# Patient Record
Sex: MALE | Race: WHITE | NOT HISPANIC OR LATINO
[De-identification: names, ages, dates, MRNs, and addresses within clinical notes are randomized per-mention and may not be internally consistent; named-entity substitution may affect disease eponyms.]

---

## 2018-04-03 ENCOUNTER — APPOINTMENT (OUTPATIENT)
Dept: INTERNAL MEDICINE | Facility: CLINIC | Age: 23
End: 2018-04-03
Payer: COMMERCIAL

## 2018-04-03 VITALS
DIASTOLIC BLOOD PRESSURE: 70 MMHG | RESPIRATION RATE: 14 BRPM | TEMPERATURE: 98.3 F | BODY MASS INDEX: 19.08 KG/M2 | OXYGEN SATURATION: 98 % | WEIGHT: 123 LBS | SYSTOLIC BLOOD PRESSURE: 120 MMHG | HEART RATE: 74 BPM | HEIGHT: 67.5 IN

## 2018-04-03 DIAGNOSIS — S62.609A FRACTURE OF UNSPECIFIED PHALANX OF UNSPECIFIED FINGER, INITIAL ENCOUNTER FOR CLOSED FRACTURE: ICD-10-CM

## 2018-04-03 PROCEDURE — 99214 OFFICE O/P EST MOD 30 MIN: CPT

## 2019-07-11 ENCOUNTER — APPOINTMENT (OUTPATIENT)
Dept: INTERNAL MEDICINE | Facility: CLINIC | Age: 24
End: 2019-07-11
Payer: COMMERCIAL

## 2019-07-11 VITALS
RESPIRATION RATE: 14 BRPM | DIASTOLIC BLOOD PRESSURE: 68 MMHG | HEART RATE: 102 BPM | SYSTOLIC BLOOD PRESSURE: 100 MMHG | TEMPERATURE: 98.4 F | OXYGEN SATURATION: 97 %

## 2019-07-11 PROCEDURE — 99214 OFFICE O/P EST MOD 30 MIN: CPT

## 2019-07-11 RX ORDER — FLUTICASONE PROPIONATE 50 UG/1
50 SPRAY, METERED NASAL DAILY
Qty: 1 | Refills: 2 | Status: ACTIVE | COMMUNITY
Start: 2019-07-11 | End: 1900-01-01

## 2019-07-11 NOTE — HISTORY OF PRESENT ILLNESS
[FreeTextEntry8] : Pt developed a sore throat 10 days ago. Pt has developed cough, sore throat, sneezing over the last 10 days.\par No fever.

## 2019-09-06 ENCOUNTER — APPOINTMENT (OUTPATIENT)
Dept: INTERNAL MEDICINE | Facility: CLINIC | Age: 24
End: 2019-09-06
Payer: COMMERCIAL

## 2019-09-06 VITALS
HEIGHT: 67.5 IN | OXYGEN SATURATION: 98 % | DIASTOLIC BLOOD PRESSURE: 78 MMHG | RESPIRATION RATE: 14 BRPM | TEMPERATURE: 98.3 F | SYSTOLIC BLOOD PRESSURE: 122 MMHG | HEART RATE: 92 BPM | BODY MASS INDEX: 20.79 KG/M2 | WEIGHT: 134 LBS

## 2019-09-06 PROCEDURE — 36415 COLL VENOUS BLD VENIPUNCTURE: CPT

## 2019-09-06 PROCEDURE — 99395 PREV VISIT EST AGE 18-39: CPT | Mod: 25

## 2019-09-06 NOTE — PHYSICAL EXAM
[No Acute Distress] : no acute distress [Well Nourished] : well nourished [Well Developed] : well developed [Well-Appearing] : well-appearing [Normal Voice/Communication] : normal voice/communication [Normal Sclera/Conjunctiva] : normal sclera/conjunctiva [EOMI] : extraocular movements intact [PERRL] : pupils equal round and reactive to light [Normal Outer Ear/Nose] : the outer ears and nose were normal in appearance [Normal Oropharynx] : the oropharynx was normal [Normal TMs] : both tympanic membranes were normal [No JVD] : no jugular venous distention [Supple] : supple [No Lymphadenopathy] : no lymphadenopathy [Thyroid Normal, No Nodules] : the thyroid was normal and there were no nodules present [No Respiratory Distress] : no respiratory distress  [No Accessory Muscle Use] : no accessory muscle use [Clear to Auscultation] : lungs were clear to auscultation bilaterally [Normal Rate] : normal rate  [Normal S1, S2] : normal S1 and S2 [Regular Rhythm] : with a regular rhythm [No Murmur] : no murmur heard [No Carotid Bruits] : no carotid bruits [No Abdominal Bruit] : a ~M bruit was not heard ~T in the abdomen [No Varicosities] : no varicosities [Pedal Pulses Present] : the pedal pulses are present [No Edema] : there was no peripheral edema [No Palpable Aorta] : no palpable aorta [No Extremity Clubbing/Cyanosis] : no extremity clubbing/cyanosis [Soft] : abdomen soft [Non-distended] : non-distended [Non Tender] : non-tender [No Masses] : no abdominal mass palpated [No HSM] : no HSM [Normal Supraclavicular Nodes] : no supraclavicular lymphadenopathy [Normal Bowel Sounds] : normal bowel sounds [Normal Posterior Cervical Nodes] : no posterior cervical lymphadenopathy [Normal Anterior Cervical Nodes] : no anterior cervical lymphadenopathy [No CVA Tenderness] : no CVA  tenderness [No Spinal Tenderness] : no spinal tenderness [No Joint Swelling] : no joint swelling [Grossly Normal Strength/Tone] : grossly normal strength/tone [No Rash] : no rash [Coordination Grossly Intact] : coordination grossly intact [No Focal Deficits] : no focal deficits [Normal Gait] : normal gait [Deep Tendon Reflexes (DTR)] : deep tendon reflexes were 2+ and symmetric [Speech Grossly Normal] : speech grossly normal [Memory Grossly Normal] : memory grossly normal [Normal Affect] : the affect was normal [Normal Mood] : the mood was normal [Normal Insight/Judgement] : insight and judgment were intact

## 2019-09-11 LAB
25(OH)D3 SERPL-MCNC: 17.5 NG/ML
ALBUMIN SERPL ELPH-MCNC: 4.8 G/DL
ALP BLD-CCNC: 76 U/L
ALT SERPL-CCNC: 18 U/L
ANION GAP SERPL CALC-SCNC: 14 MMOL/L
AST SERPL-CCNC: 14 U/L
BASOPHILS # BLD AUTO: 0.04 K/UL
BASOPHILS NFR BLD AUTO: 0.6 %
BILIRUB SERPL-MCNC: 0.4 MG/DL
BUN SERPL-MCNC: 13 MG/DL
CALCIUM SERPL-MCNC: 9.7 MG/DL
CHLORIDE SERPL-SCNC: 101 MMOL/L
CHOLEST SERPL-MCNC: 144 MG/DL
CHOLEST/HDLC SERPL: 2.6 RATIO
CK SERPL-CCNC: 121 U/L
CO2 SERPL-SCNC: 26 MMOL/L
CREAT SERPL-MCNC: 0.81 MG/DL
CREAT SPEC-SCNC: 128 MG/DL
EOSINOPHIL # BLD AUTO: 0.04 K/UL
EOSINOPHIL NFR BLD AUTO: 0.6 %
ESTIMATED AVERAGE GLUCOSE: 203 MG/DL
GLUCOSE SERPL-MCNC: 231 MG/DL
HBA1C MFR BLD HPLC: 8.7 %
HCT VFR BLD CALC: 46.2 %
HDLC SERPL-MCNC: 56 MG/DL
HGB BLD-MCNC: 15.4 G/DL
IMM GRANULOCYTES NFR BLD AUTO: 0.3 %
LDLC SERPL CALC-MCNC: 67 MG/DL
LYMPHOCYTES # BLD AUTO: 1.68 K/UL
LYMPHOCYTES NFR BLD AUTO: 24.5 %
MAN DIFF?: NORMAL
MCHC RBC-ENTMCNC: 29.2 PG
MCHC RBC-ENTMCNC: 33.3 GM/DL
MCV RBC AUTO: 87.5 FL
MICROALBUMIN 24H UR DL<=1MG/L-MCNC: 1.3 MG/DL
MICROALBUMIN/CREAT 24H UR-RTO: 10 MG/G
MONOCYTES # BLD AUTO: 0.52 K/UL
MONOCYTES NFR BLD AUTO: 7.6 %
NEUTROPHILS # BLD AUTO: 4.56 K/UL
NEUTROPHILS NFR BLD AUTO: 66.4 %
PLATELET # BLD AUTO: 221 K/UL
POTASSIUM SERPL-SCNC: 4.7 MMOL/L
PROT SERPL-MCNC: 6.6 G/DL
RBC # BLD: 5.28 M/UL
RBC # FLD: 12.7 %
SODIUM SERPL-SCNC: 141 MMOL/L
TRIGL SERPL-MCNC: 107 MG/DL
TSH SERPL-ACNC: 1.12 UIU/ML
WBC # FLD AUTO: 6.86 K/UL

## 2020-01-13 ENCOUNTER — APPOINTMENT (OUTPATIENT)
Dept: INTERNAL MEDICINE | Facility: CLINIC | Age: 25
End: 2020-01-13
Payer: COMMERCIAL

## 2020-01-13 VITALS
HEIGHT: 67.5 IN | SYSTOLIC BLOOD PRESSURE: 120 MMHG | BODY MASS INDEX: 20.94 KG/M2 | TEMPERATURE: 98.8 F | WEIGHT: 135 LBS | DIASTOLIC BLOOD PRESSURE: 70 MMHG | HEART RATE: 112 BPM | RESPIRATION RATE: 14 BRPM | OXYGEN SATURATION: 96 %

## 2020-01-13 DIAGNOSIS — J01.00 ACUTE MAXILLARY SINUSITIS, UNSPECIFIED: ICD-10-CM

## 2020-01-13 PROCEDURE — 99214 OFFICE O/P EST MOD 30 MIN: CPT

## 2020-01-13 PROCEDURE — 87880 STREP A ASSAY W/OPTIC: CPT | Mod: QW

## 2020-01-13 NOTE — REVIEW OF SYSTEMS
[Earache] : earache [Sore Throat] : sore throat [Cough] : cough [Negative] : Heme/Lymph [Nasal Discharge] : no nasal discharge [Shortness Of Breath] : no shortness of breath [Wheezing] : no wheezing

## 2020-01-13 NOTE — HISTORY OF PRESENT ILLNESS
[___ Days ago] : [unfilled] days ago [Episodic] : episodic  [Moderate] : moderate [Congestion] : congestion [Cough] : cough [Sore Throat] : sore throat [Earache] : earache [Worsening] : worsening [Wheezing] : no wheezing [Chills] : no chills [Anorexia] : no anorexia [Fatigue] : not fatigue [Shortness Of Breath] : no shortness of breath [Headache] : no headache [Fever] : no fever

## 2020-01-13 NOTE — PHYSICAL EXAM
[No Acute Distress] : no acute distress [Well Nourished] : well nourished [Well Developed] : well developed [Well-Appearing] : well-appearing [Normal Sclera/Conjunctiva] : normal sclera/conjunctiva [Normal Voice/Communication] : normal voice/communication [Normal Outer Ear/Nose] : the outer ears and nose were normal in appearance [EOMI] : extraocular movements intact [PERRL] : pupils equal round and reactive to light [Normal TMs] : both tympanic membranes were normal [Normal Oropharynx] : the oropharynx was normal [No JVD] : no jugular venous distention [Thyroid Normal, No Nodules] : the thyroid was normal and there were no nodules present [No Respiratory Distress] : no respiratory distress  [Supple] : supple [No Lymphadenopathy] : no lymphadenopathy [No Accessory Muscle Use] : no accessory muscle use [Clear to Auscultation] : lungs were clear to auscultation bilaterally [Normal Rate] : normal rate  [Regular Rhythm] : with a regular rhythm [Normal S1, S2] : normal S1 and S2 [No Murmur] : no murmur heard [No Abdominal Bruit] : a ~M bruit was not heard ~T in the abdomen [No Carotid Bruits] : no carotid bruits [No Varicosities] : no varicosities [Pedal Pulses Present] : the pedal pulses are present [No Edema] : there was no peripheral edema [No Extremity Clubbing/Cyanosis] : no extremity clubbing/cyanosis [No Palpable Aorta] : no palpable aorta [Soft] : abdomen soft [No Masses] : no abdominal mass palpated [Non-distended] : non-distended [Non Tender] : non-tender [Normal Supraclavicular Nodes] : no supraclavicular lymphadenopathy [Normal Bowel Sounds] : normal bowel sounds [No HSM] : no HSM [Normal Anterior Cervical Nodes] : no anterior cervical lymphadenopathy [Normal Posterior Cervical Nodes] : no posterior cervical lymphadenopathy [No CVA Tenderness] : no CVA  tenderness [No Spinal Tenderness] : no spinal tenderness [Grossly Normal Strength/Tone] : grossly normal strength/tone [No Joint Swelling] : no joint swelling [No Rash] : no rash [No Focal Deficits] : no focal deficits [Coordination Grossly Intact] : coordination grossly intact [Deep Tendon Reflexes (DTR)] : deep tendon reflexes were 2+ and symmetric [Normal Gait] : normal gait [Speech Grossly Normal] : speech grossly normal [Normal Insight/Judgement] : insight and judgment were intact [Normal Affect] : the affect was normal

## 2020-11-16 ENCOUNTER — APPOINTMENT (OUTPATIENT)
Dept: INTERNAL MEDICINE | Facility: CLINIC | Age: 25
End: 2020-11-16
Payer: COMMERCIAL

## 2020-11-16 PROCEDURE — G0008: CPT

## 2020-11-16 PROCEDURE — 99072 ADDL SUPL MATRL&STAF TM PHE: CPT

## 2020-11-16 PROCEDURE — 90686 IIV4 VACC NO PRSV 0.5 ML IM: CPT

## 2021-01-19 ENCOUNTER — APPOINTMENT (OUTPATIENT)
Dept: INTERNAL MEDICINE | Facility: CLINIC | Age: 26
End: 2021-01-19
Payer: COMMERCIAL

## 2021-01-19 ENCOUNTER — RX RENEWAL (OUTPATIENT)
Age: 26
End: 2021-01-19

## 2021-01-19 VITALS
TEMPERATURE: 97.5 F | HEIGHT: 67.5 IN | RESPIRATION RATE: 14 BRPM | HEART RATE: 93 BPM | SYSTOLIC BLOOD PRESSURE: 120 MMHG | WEIGHT: 141 LBS | OXYGEN SATURATION: 98 % | BODY MASS INDEX: 21.87 KG/M2 | DIASTOLIC BLOOD PRESSURE: 76 MMHG

## 2021-01-19 DIAGNOSIS — Z00.00 ENCOUNTER FOR GENERAL ADULT MEDICAL EXAMINATION W/OUT ABNORMAL FINDINGS: ICD-10-CM

## 2021-01-19 PROCEDURE — 99072 ADDL SUPL MATRL&STAF TM PHE: CPT

## 2021-01-19 PROCEDURE — 99395 PREV VISIT EST AGE 18-39: CPT

## 2021-01-19 RX ORDER — AZITHROMYCIN 250 MG/1
250 TABLET, FILM COATED ORAL
Qty: 1 | Refills: 0 | Status: DISCONTINUED | COMMUNITY
Start: 2019-07-11 | End: 2021-01-19

## 2021-01-19 RX ORDER — DOXYCYCLINE 100 MG/1
100 TABLET, FILM COATED ORAL
Qty: 14 | Refills: 0 | Status: DISCONTINUED | COMMUNITY
Start: 2020-01-13 | End: 2021-01-19

## 2021-01-19 NOTE — HEALTH RISK ASSESSMENT
[Good] : ~his/her~  mood as  good [No] : In the past 12 months have you used drugs other than those required for medical reasons? No [No falls in past year] : Patient reported no falls in the past year [0] : 2) Feeling down, depressed, or hopeless: Not at all (0) [] : No [[id number]] : 0

## 2021-01-19 NOTE — HISTORY OF PRESENT ILLNESS
[FreeTextEntry1] : annual physical  [de-identified] : SKYE CHERY is a 25 year old M who presents today for annual physical. Patient has no new complaints

## 2021-01-19 NOTE — PHYSICAL EXAM
[No Acute Distress] : no acute distress [Well Nourished] : well nourished [Well Developed] : well developed [Well-Appearing] : well-appearing [Normal Voice/Communication] : normal voice/communication [Normal Sclera/Conjunctiva] : normal sclera/conjunctiva [PERRL] : pupils equal round and reactive to light [EOMI] : extraocular movements intact [Normal Outer Ear/Nose] : the outer ears and nose were normal in appearance [Normal TMs] : both tympanic membranes were normal [No JVD] : no jugular venous distention [No Lymphadenopathy] : no lymphadenopathy [Thyroid Normal, No Nodules] : the thyroid was normal and there were no nodules present [Supple] : supple [No Respiratory Distress] : no respiratory distress  [No Accessory Muscle Use] : no accessory muscle use [Clear to Auscultation] : lungs were clear to auscultation bilaterally [Normal Rate] : normal rate  [Regular Rhythm] : with a regular rhythm [Normal S1, S2] : normal S1 and S2 [No Murmur] : no murmur heard [No Carotid Bruits] : no carotid bruits [No Abdominal Bruit] : a ~M bruit was not heard ~T in the abdomen [No Varicosities] : no varicosities [Pedal Pulses Present] : the pedal pulses are present [No Edema] : there was no peripheral edema [No Palpable Aorta] : no palpable aorta [No Extremity Clubbing/Cyanosis] : no extremity clubbing/cyanosis [Soft] : abdomen soft [Non Tender] : non-tender [Non-distended] : non-distended [No Masses] : no abdominal mass palpated [No HSM] : no HSM [Normal Bowel Sounds] : normal bowel sounds [No Hernias] : no hernias [Normal Supraclavicular Nodes] : no supraclavicular lymphadenopathy [Normal Axillary Nodes] : no axillary lymphadenopathy [Normal Posterior Cervical Nodes] : no posterior cervical lymphadenopathy [Normal Anterior Cervical Nodes] : no anterior cervical lymphadenopathy [Normal Inguinal Nodes] : no inguinal lymphadenopathy [Normal Femoral Nodes] : no femoral lymphadenopathy [No CVA Tenderness] : no CVA  tenderness [No Spinal Tenderness] : no spinal tenderness [No Joint Swelling] : no joint swelling [Grossly Normal Strength/Tone] : grossly normal strength/tone [No Rash] : no rash [Coordination Grossly Intact] : coordination grossly intact [No Focal Deficits] : no focal deficits [Normal Gait] : normal gait [Deep Tendon Reflexes (DTR)] : deep tendon reflexes were 2+ and symmetric [Speech Grossly Normal] : speech grossly normal [Memory Grossly Normal] : memory grossly normal [Normal Affect] : the affect was normal [Alert and Oriented x3] : oriented to person, place, and time [Normal Mood] : the mood was normal [Normal Insight/Judgement] : insight and judgment were intact [Penis Abnormality] : normal circumcised penis [Scrotum] : the scrotum was normal [Testes Tenderness] : no tenderness of the testes [Testes Mass (___cm)] : there were no testicular masses

## 2021-01-21 LAB
25(OH)D3 SERPL-MCNC: 28.7 NG/ML
ALBUMIN SERPL ELPH-MCNC: 5 G/DL
ALP BLD-CCNC: 82 U/L
ALT SERPL-CCNC: 19 U/L
ANION GAP SERPL CALC-SCNC: 14 MMOL/L
APPEARANCE: CLEAR
AST SERPL-CCNC: 16 U/L
BASOPHILS # BLD AUTO: 0.03 K/UL
BASOPHILS NFR BLD AUTO: 0.4 %
BILIRUB SERPL-MCNC: 0.3 MG/DL
BILIRUBIN URINE: NEGATIVE
BLOOD URINE: NEGATIVE
BUN SERPL-MCNC: 16 MG/DL
CALCIUM SERPL-MCNC: 9.9 MG/DL
CHLORIDE SERPL-SCNC: 99 MMOL/L
CHOLEST SERPL-MCNC: 167 MG/DL
CK SERPL-CCNC: 86 U/L
CO2 SERPL-SCNC: 26 MMOL/L
COLOR: NORMAL
CREAT SERPL-MCNC: 0.75 MG/DL
CREAT SPEC-SCNC: 88 MG/DL
EOSINOPHIL # BLD AUTO: 0.08 K/UL
EOSINOPHIL NFR BLD AUTO: 1.1 %
ESTIMATED AVERAGE GLUCOSE: 197 MG/DL
GLUCOSE QUALITATIVE U: ABNORMAL
GLUCOSE SERPL-MCNC: 244 MG/DL
HBA1C MFR BLD HPLC: 8.5 %
HCT VFR BLD CALC: 44.7 %
HDLC SERPL-MCNC: 59 MG/DL
HGB BLD-MCNC: 14.9 G/DL
IMM GRANULOCYTES NFR BLD AUTO: 0.3 %
KETONES URINE: NEGATIVE
LDLC SERPL CALC-MCNC: 96 MG/DL
LEUKOCYTE ESTERASE URINE: NEGATIVE
LYMPHOCYTES # BLD AUTO: 2.2 K/UL
LYMPHOCYTES NFR BLD AUTO: 31.2 %
MAN DIFF?: NORMAL
MCHC RBC-ENTMCNC: 28.8 PG
MCHC RBC-ENTMCNC: 33.3 GM/DL
MCV RBC AUTO: 86.3 FL
MICROALBUMIN 24H UR DL<=1MG/L-MCNC: 1.2 MG/DL
MICROALBUMIN/CREAT 24H UR-RTO: 14 MG/G
MONOCYTES # BLD AUTO: 0.48 K/UL
MONOCYTES NFR BLD AUTO: 6.8 %
NEUTROPHILS # BLD AUTO: 4.24 K/UL
NEUTROPHILS NFR BLD AUTO: 60.2 %
NITRITE URINE: NEGATIVE
NONHDLC SERPL-MCNC: 108 MG/DL
PH URINE: 6
PLATELET # BLD AUTO: 223 K/UL
POTASSIUM SERPL-SCNC: 4 MMOL/L
PROT SERPL-MCNC: 7.2 G/DL
PROTEIN URINE: NEGATIVE
RBC # BLD: 5.18 M/UL
RBC # FLD: 12.8 %
SODIUM SERPL-SCNC: 139 MMOL/L
SPECIFIC GRAVITY URINE: 1.04
TRIGL SERPL-MCNC: 60 MG/DL
TSH SERPL-ACNC: 1.33 UIU/ML
UROBILINOGEN URINE: NORMAL
WBC # FLD AUTO: 7.05 K/UL

## 2021-03-03 ENCOUNTER — APPOINTMENT (OUTPATIENT)
Dept: ENDOCRINOLOGY | Facility: CLINIC | Age: 26
End: 2021-03-03
Payer: COMMERCIAL

## 2021-03-03 ENCOUNTER — RESULT CHARGE (OUTPATIENT)
Age: 26
End: 2021-03-03

## 2021-03-03 VITALS
RESPIRATION RATE: 14 BRPM | HEIGHT: 67 IN | BODY MASS INDEX: 22.13 KG/M2 | HEART RATE: 88 BPM | OXYGEN SATURATION: 97 % | WEIGHT: 141 LBS | SYSTOLIC BLOOD PRESSURE: 136 MMHG | DIASTOLIC BLOOD PRESSURE: 81 MMHG

## 2021-03-03 PROCEDURE — 95251 CONT GLUC MNTR ANALYSIS I&R: CPT

## 2021-03-03 PROCEDURE — 36415 COLL VENOUS BLD VENIPUNCTURE: CPT

## 2021-03-03 PROCEDURE — 82962 GLUCOSE BLOOD TEST: CPT

## 2021-03-03 PROCEDURE — 99205 OFFICE O/P NEW HI 60 MIN: CPT | Mod: 25

## 2021-03-03 PROCEDURE — 99072 ADDL SUPL MATRL&STAF TM PHE: CPT

## 2021-03-04 NOTE — HISTORY OF PRESENT ILLNESS
[FreeTextEntry1] : Mr. SKYE CHERY  is a 25 year old male with past medical history of Diabetes Mellitus Type 1  who presents for management of his diabetes. Patient was diagnosed as a child. He has been on Omnipod for over 10 years. His pump settings have not been adjusted for a long time. Patients sugars have been trending higher. Patient denies any history of diabetic retinopathy, nephropathy or neuropathy. He denies any blurry vision, polyuria, polydipsia and numbness/tingling in the extremities.\par \par \par POCT Glucose: 159 mg/dL\par POCT Hga1c: %\par \par Diabetes first diagnosed: 2004\par Type: 1\par \par Current diabetic regimen:\par Omnipod with Novolog \par Pump settings: \par Basal:\par 12 AM: 1.05\par 5 AM: 1.1\par 10 AM: 1.15\par 5 PM: 1.2 pm\par 9 PM: 1.05\par \par ISF:\par 12 AM: 50\par \par I:C\par 12 AM: 15\par 5 AM: 10\par 10 AM: 15\par \par Target:\par 12 AM: 120\par \par \par Self monitoring blood glucose : Patient uses Freestyle Brittany\par \par AGP Report: \par (11/5/20-2/2/21)\par High: 33%\par Target (): 46%\par Low: 5%\par \par \par Diet: Eats everything. Will eat simple carbs and extend bolus\par \par Exercise:\par \par Urine micro:wnl (1/2021)\par lipid profile:LDL 96 (1/2021)\par last hba1c: 8.5% (1/2021)\par eye exam: 12/2020\par diabetic foot exam/podiatry:NA \par \par \par \par \par \par

## 2021-03-04 NOTE — ASSESSMENT
[FreeTextEntry1] :  25 year old male with past medical history of Diabetes Mellitus Type 1,  who presents for management of his diabetes\par \par 1. DM 1- uncontrolled, uncomplicated\par Patient counseled on the importance of diabetic control and complications. Patient's diet and the necessary changes discussed. Reviewed over freestyle tobin and use. Reviewed over glycemic goals. Patient has too much basal on board anf is not getting enough postprandially. Will need to reduce his highs and lows. Patient also needs to updated with newer devices and may benefit better with new automatic basal pumps. He is a lean type 1 and has difficulty gaining weight.\par New settings\par Basal:\par 12 AM: .95*\par 5 AM: 1.0*\par 10 AM: 1.15\par 5 PM: 1.2 pm\par 9 PM: 1.05\par \par ISF:\par 12 AM: 50\par \par I:C\par 12 AM: 15\par 5 AM: 10\par 10 AM: 12*\par \par Target:\par 12 AM: 120\par \par Check sensor\par Cont diabetic diet\par Cont exercise\par Will refer to CDE/RD\par labs UTD\par Opthalmology Visit up to date\par Foot Exam up to date\par \par \par \par

## 2021-03-04 NOTE — CONSULT LETTER
[Dear  ___] : Dear  [unfilled], [Consult Letter:] : I had the pleasure of evaluating your patient, [unfilled]. [Please see my note below.] : Please see my note below. [Consult Closing:] : Thank you very much for allowing me to participate in the care of this patient.  If you have any questions, please do not hesitate to contact me. [Sincerely,] : Sincerely, [FreeTextEntry3] : Tiffanie Jha MS. DO.\par Endocrinology, Diabetes and Metabolism\par [address] [place] [address]\[place] [address]\par Tel [phone]\par Fax [phone]\par

## 2021-03-05 LAB
ALBUMIN SERPL ELPH-MCNC: 4.9 G/DL
ALP BLD-CCNC: 75 U/L
ALT SERPL-CCNC: 16 U/L
ANION GAP SERPL CALC-SCNC: 11 MMOL/L
AST SERPL-CCNC: 16 U/L
BILIRUB SERPL-MCNC: 0.3 MG/DL
BUN SERPL-MCNC: 13 MG/DL
C PEPTIDE SERPL-MCNC: <0.1 NG/ML
CALCIUM SERPL-MCNC: 9.5 MG/DL
CHLORIDE SERPL-SCNC: 103 MMOL/L
CO2 SERPL-SCNC: 27 MMOL/L
CREAT SERPL-MCNC: 0.68 MG/DL
GLUCOSE SERPL-MCNC: 133 MG/DL
POTASSIUM SERPL-SCNC: 4.4 MMOL/L
PROT SERPL-MCNC: 6.7 G/DL
SODIUM SERPL-SCNC: 141 MMOL/L

## 2021-03-08 LAB
GAD65 AB SER-MCNC: 0 NMOL/L
PANC ISLET CELL AB SER QL: NORMAL

## 2021-03-11 LAB — INSULIN AB SER IA-ACNC: 15.9 U/ML

## 2021-03-17 ENCOUNTER — APPOINTMENT (OUTPATIENT)
Dept: ENDOCRINOLOGY | Facility: CLINIC | Age: 26
End: 2021-03-17
Payer: COMMERCIAL

## 2021-03-17 VITALS — WEIGHT: 136 LBS | HEIGHT: 67 IN | BODY MASS INDEX: 21.35 KG/M2

## 2021-03-17 PROCEDURE — G0108 DIAB MANAGE TRN  PER INDIV: CPT

## 2021-03-17 PROCEDURE — 99072 ADDL SUPL MATRL&STAF TM PHE: CPT

## 2021-03-30 ENCOUNTER — APPOINTMENT (OUTPATIENT)
Dept: ENDOCRINOLOGY | Facility: CLINIC | Age: 26
End: 2021-03-30

## 2021-03-30 ENCOUNTER — APPOINTMENT (OUTPATIENT)
Dept: ENDOCRINOLOGY | Facility: CLINIC | Age: 26
End: 2021-03-30
Payer: COMMERCIAL

## 2021-03-30 PROCEDURE — G0108 DIAB MANAGE TRN  PER INDIV: CPT

## 2021-03-30 PROCEDURE — 99072 ADDL SUPL MATRL&STAF TM PHE: CPT

## 2021-04-08 ENCOUNTER — APPOINTMENT (OUTPATIENT)
Dept: ENDOCRINOLOGY | Facility: CLINIC | Age: 26
End: 2021-04-08
Payer: COMMERCIAL

## 2021-04-08 PROCEDURE — G0108 DIAB MANAGE TRN  PER INDIV: CPT

## 2021-04-10 ENCOUNTER — NON-APPOINTMENT (OUTPATIENT)
Age: 26
End: 2021-04-10

## 2021-04-14 ENCOUNTER — RESULT CHARGE (OUTPATIENT)
Age: 26
End: 2021-04-14

## 2021-04-14 ENCOUNTER — APPOINTMENT (OUTPATIENT)
Dept: ENDOCRINOLOGY | Facility: CLINIC | Age: 26
End: 2021-04-14
Payer: COMMERCIAL

## 2021-04-14 VITALS
OXYGEN SATURATION: 97 % | WEIGHT: 136 LBS | HEART RATE: 79 BPM | RESPIRATION RATE: 14 BRPM | BODY MASS INDEX: 21.35 KG/M2 | DIASTOLIC BLOOD PRESSURE: 88 MMHG | SYSTOLIC BLOOD PRESSURE: 106 MMHG | HEIGHT: 67 IN

## 2021-04-14 PROCEDURE — 82962 GLUCOSE BLOOD TEST: CPT

## 2021-04-14 PROCEDURE — 99214 OFFICE O/P EST MOD 30 MIN: CPT | Mod: 25

## 2021-04-14 PROCEDURE — 99072 ADDL SUPL MATRL&STAF TM PHE: CPT

## 2021-04-14 PROCEDURE — 36415 COLL VENOUS BLD VENIPUNCTURE: CPT

## 2021-04-14 PROCEDURE — 95251 CONT GLUC MNTR ANALYSIS I&R: CPT

## 2021-04-14 NOTE — HISTORY OF PRESENT ILLNESS
[FreeTextEntry1] : Mr. SKYE CHERY  is a 25 year old male with past medical history of Diabetes Mellitus Type 1  who presents for management of his diabetes. \par \par \par POCT Glucose: 204 mg/dL\par POCT Hga1c: %\par \par Diabetes first diagnosed: 2004\par Type: 1\par \par Current diabetic regimen:\par Omnipod with Novolog \par Pump settings: \par Basal:\par 12 AM: 0.95\par 5 AM: 1.0\par 10 AM: .9\par 5 PM: 1.2 pm\par 9 PM: 1.05\par \par ISF:\par 12 AM: 50\par \par I:C\par 12 AM: 15\par 5 AM: 10\par 10 AM: 15\par \par Target:\par 12 AM: 120\par \par \par Self monitoring blood glucose : Patient uses Dexcom now\par \par AGP Report: \par (4/8/21-4/14/21)\par High: 74%\par Target (): 23%\par Low: 2%\par \par \par Diet: Eats everything. Will eat simple carbs and extend bolus\par \par Exercise:\par \par Urine micro:wnl (1/2021)\par lipid profile:LDL 96 (1/2021)\par last hba1c: 8.5% (1/2021)\par eye exam: 12/2020\par diabetic foot exam/podiatry:in office (3/2021) \par \par \par \par \par \par

## 2021-04-14 NOTE — ASSESSMENT
[FreeTextEntry1] :  25 year old male with past medical history of Diabetes Mellitus Type 1,  who presents for management of his diabetes\par \par 1. DM 1- uncontrolled, uncomplicated\par Patient counseled on the importance of diabetic control and complications. Patient's diet and the necessary changes discussed. Reviewed over Dexcom. Patient to be placed on tandem with auto basal technology. Expect much improvement in his control. \par \par New settings\par Basal:\par 12 AM: 1.05*\par 5 AM: 1.0\par 10 AM: 1.15\par 5 PM: 1.2 pm\par 9 PM: 1.05\par \par ISF:\par 12 AM: 50\par \par I:C\par 12 AM: 15\par 5 AM: 10\par 10 AM: 18*\par 4 PM: 15*\par \par Target:\par 12 AM: 120\par \par Check sensor\par Cont diabetic diet\par Cont exercise\par labs UTD\par Opthalmology Visit up to date\par Foot Exam up to date\par \par Follow up in 2 months\par \par

## 2021-04-15 ENCOUNTER — APPOINTMENT (OUTPATIENT)
Dept: ENDOCRINOLOGY | Facility: CLINIC | Age: 26
End: 2021-04-15
Payer: COMMERCIAL

## 2021-04-15 PROCEDURE — G0108 DIAB MANAGE TRN  PER INDIV: CPT

## 2021-04-15 PROCEDURE — 99072 ADDL SUPL MATRL&STAF TM PHE: CPT

## 2021-04-16 ENCOUNTER — NON-APPOINTMENT (OUTPATIENT)
Age: 26
End: 2021-04-16

## 2021-04-22 ENCOUNTER — NON-APPOINTMENT (OUTPATIENT)
Age: 26
End: 2021-04-22

## 2021-05-04 ENCOUNTER — APPOINTMENT (OUTPATIENT)
Dept: ENDOCRINOLOGY | Facility: CLINIC | Age: 26
End: 2021-05-04
Payer: COMMERCIAL

## 2021-05-04 PROCEDURE — G0108 DIAB MANAGE TRN  PER INDIV: CPT

## 2021-05-04 PROCEDURE — 99072 ADDL SUPL MATRL&STAF TM PHE: CPT

## 2021-05-26 ENCOUNTER — APPOINTMENT (OUTPATIENT)
Dept: ENDOCRINOLOGY | Facility: CLINIC | Age: 26
End: 2021-05-26
Payer: COMMERCIAL

## 2021-05-26 VITALS — BODY MASS INDEX: 21.5 KG/M2 | WEIGHT: 137 LBS | HEIGHT: 67 IN

## 2021-05-26 PROCEDURE — G0108 DIAB MANAGE TRN  PER INDIV: CPT

## 2021-06-10 ENCOUNTER — NON-APPOINTMENT (OUTPATIENT)
Age: 26
End: 2021-06-10

## 2021-06-15 ENCOUNTER — APPOINTMENT (OUTPATIENT)
Dept: ENDOCRINOLOGY | Facility: CLINIC | Age: 26
End: 2021-06-15
Payer: COMMERCIAL

## 2021-06-15 VITALS — HEART RATE: 83 BPM | OXYGEN SATURATION: 98 % | DIASTOLIC BLOOD PRESSURE: 77 MMHG | SYSTOLIC BLOOD PRESSURE: 121 MMHG

## 2021-06-15 PROCEDURE — 83036 HEMOGLOBIN GLYCOSYLATED A1C: CPT | Mod: QW

## 2021-06-15 PROCEDURE — 99215 OFFICE O/P EST HI 40 MIN: CPT | Mod: 25

## 2021-06-15 PROCEDURE — 82962 GLUCOSE BLOOD TEST: CPT

## 2021-06-15 PROCEDURE — 95251 CONT GLUC MNTR ANALYSIS I&R: CPT

## 2021-06-15 NOTE — HISTORY OF PRESENT ILLNESS
[FreeTextEntry1] : Mr. SKYE CHERY  is a 25 year old male with past medical history of Diabetes Mellitus Type 1  who presents for management of his diabetes. \par \par \par POCT Glucose: 277 mg/dL\par POCT Hga1c: 6.8 %\par \par Diabetes first diagnosed: 2004\par Type: 1\par \par Current diabetic regimen:\par Omnipod with Novolog \par Pump settings: \par Basal:\par 12 AM: 1.15\par 4 AM: 0.9\par 11 AM: .6\par 5 PM: 1.1 \par 9 PM: 1.1\par \par ISF:\par 12 AM: 50\par \par I:C\par 12 AM: 14\par 4 AM: 6\par 11 AM: 10\par \par Target:\par 12 AM: 110\par \par \par Self monitoring blood glucose : Patient uses Dexcom \par \par AGP Report: \par (5/16/21-6/14/21)\par High: 34%\par Target (): 65%\par Low: 1%\par \par \par Diet: Eats everything. Will eat simple carbs and extend bolus\par \par Exercise:\par \par Urine micro:wnl (1/2021)\par lipid profile:LDL 96 (1/2021)\par last hba1c: 6.8% (6/2021) 8.5% (1/2021)\par eye exam: 12/2020\par diabetic foot exam/podiatry:in office (3/2021) \par \par \par \par \par \par

## 2021-06-15 NOTE — ASSESSMENT
[FreeTextEntry1] :  25 year old male with past medical history of Diabetes Mellitus Type 1,  who presents for management of his diabetes\par \par 1. DM 1- controlled, uncomplicated\par Patient counseled on the importance of diabetic control and complications. Patient's diet and the necessary changes discussed. Reviewed over Dexcom. Overall much improved in glycemic control. Have to reduce the lows and highs further.\par \par New settings\par Basal:\par 12 AM: 1.15\par 4 AM: 0.9\par 11 AM: .6\par 5 PM: 1.0 \par 9 PM: 1.0\par \par ISF:\par 12 AM: 50\par \par I:C\par 12 AM: 14\par 4 AM: 6\par 11 AM: 10\par \par Target:\par 12 AM: 120\par \par Check sensor\par Cont diabetic diet\par Cont exercise\par labs UTD\par Opthalmology Visit up to date\par Foot Exam up to date\par \par Follow up in 2 months\par \par

## 2021-06-22 ENCOUNTER — RX RENEWAL (OUTPATIENT)
Age: 26
End: 2021-06-22

## 2021-07-14 ENCOUNTER — APPOINTMENT (OUTPATIENT)
Dept: ENDOCRINOLOGY | Facility: CLINIC | Age: 26
End: 2021-07-14
Payer: COMMERCIAL

## 2021-07-14 VITALS — HEIGHT: 67 IN | WEIGHT: 140 LBS | BODY MASS INDEX: 21.97 KG/M2

## 2021-07-14 PROCEDURE — 95251 CONT GLUC MNTR ANALYSIS I&R: CPT

## 2021-07-14 PROCEDURE — G0108 DIAB MANAGE TRN  PER INDIV: CPT

## 2021-08-17 ENCOUNTER — APPOINTMENT (OUTPATIENT)
Dept: ENDOCRINOLOGY | Facility: CLINIC | Age: 26
End: 2021-08-17
Payer: COMMERCIAL

## 2021-08-17 VITALS — HEIGHT: 67 IN | WEIGHT: 140 LBS | BODY MASS INDEX: 21.97 KG/M2

## 2021-08-17 PROCEDURE — G0108 DIAB MANAGE TRN  PER INDIV: CPT

## 2021-10-04 ENCOUNTER — APPOINTMENT (OUTPATIENT)
Dept: ENDOCRINOLOGY | Facility: CLINIC | Age: 26
End: 2021-10-04
Payer: COMMERCIAL

## 2021-10-04 VITALS
BODY MASS INDEX: 23.23 KG/M2 | OXYGEN SATURATION: 98 % | HEART RATE: 78 BPM | DIASTOLIC BLOOD PRESSURE: 81 MMHG | SYSTOLIC BLOOD PRESSURE: 126 MMHG | HEIGHT: 67 IN | WEIGHT: 148 LBS

## 2021-10-04 PROCEDURE — 83036 HEMOGLOBIN GLYCOSYLATED A1C: CPT | Mod: QW

## 2021-10-04 PROCEDURE — 99214 OFFICE O/P EST MOD 30 MIN: CPT | Mod: 25

## 2021-10-04 PROCEDURE — 95251 CONT GLUC MNTR ANALYSIS I&R: CPT

## 2021-10-04 PROCEDURE — 82962 GLUCOSE BLOOD TEST: CPT

## 2021-10-04 NOTE — HISTORY OF PRESENT ILLNESS
[FreeTextEntry1] : Mr. SKYE CHERY  is a 25 year old male with past medical history of Diabetes Mellitus Type 1  who presents for management of his diabetes. \par \par \par POCT Glucose: 149 mg/dL\par POCT Hga1c: 6.7 %\par \par Diabetes first diagnosed: 2004\par Type: 1\par \par Current diabetic regimen:\par Omnipod with Novolog \par Pump settings: \par Basal:\par 12 AM: 1.15\par 4 AM: 0.9\par 11 AM: .5\par 5 PM: 0.8 \par 9 PM: 0.9\par \par ISF:\par 12 AM: 50\par \par I:C\par 12 AM: 14\par 4 AM: 5\par 11 AM: 7.5\par 5 PM: 8.5\par 9 PM: 9\par \par Target:\par 12 AM: 110\par \par \par Self monitoring blood glucose : Patient uses Dexcom \par \par AGP Report: \par (7/7/21-10/4/21)\par High: 28.5%\par Target (): 69.9%\par Low: 1.5%\par \par \par Diet: Eats everything. Will eat simple carbs and extend bolus\par \par Exercise:\par \par Urine micro:wnl (8/2021), wnl (1/2021)\par lipid profile:LDL 96 (8/2021), LDL 96 (1/2021)\par last hba1c: 6.8% (6/2021) 8.5% (1/2021)\par eye exam: 12/2020\par diabetic foot exam/podiatry:in office (3/2021) \par \par \par \par \par \par

## 2021-10-04 NOTE — ASSESSMENT
[FreeTextEntry1] :  25 year old male with past medical history of Diabetes Mellitus Type 1,  who presents for management of his diabetes\par \par 1. DM 1- controlled, uncomplicated\par Patient counseled on the importance of diabetic control and complications. Patient's diet and the necessary changes discussed. Reviewed over Dexcom. Overall much improved in glycemic control. Have to reduce the lows and highs further.\par \par New settings\par Basal:\par 12 AM: 1.15\par 4 AM: 0.9\par 11 AM: .5\par 5 PM: 0.8 \par 9 PM: 0.9\par \par ISF:\par 12 AM: 50\par \par I:C\par 12 AM: 14\par 4 AM: 5\par 11 AM: 7*\par 5 PM: 8*\par 9 PM: 8.5*\par \par Target:\par 12 AM: 120\par \par Check sensor\par Cont diabetic diet\par Cont exercise\par labs UTD\par Opthalmology Visit up to date\par Foot Exam up to date\par \par Follow up in 3 months\par \par

## 2021-10-06 ENCOUNTER — APPOINTMENT (OUTPATIENT)
Dept: INTERNAL MEDICINE | Facility: CLINIC | Age: 26
End: 2021-10-06
Payer: COMMERCIAL

## 2021-10-06 VITALS
WEIGHT: 149 LBS | DIASTOLIC BLOOD PRESSURE: 70 MMHG | RESPIRATION RATE: 14 BRPM | SYSTOLIC BLOOD PRESSURE: 120 MMHG | BODY MASS INDEX: 23.39 KG/M2 | OXYGEN SATURATION: 98 % | TEMPERATURE: 98 F | HEIGHT: 67 IN | HEART RATE: 87 BPM

## 2021-10-06 VITALS
TEMPERATURE: 98 F | OXYGEN SATURATION: 98 % | HEART RATE: 87 BPM | BODY MASS INDEX: 23.39 KG/M2 | DIASTOLIC BLOOD PRESSURE: 70 MMHG | HEIGHT: 67 IN | RESPIRATION RATE: 14 BRPM | WEIGHT: 149 LBS | SYSTOLIC BLOOD PRESSURE: 120 MMHG

## 2021-10-06 DIAGNOSIS — Z23 ENCOUNTER FOR IMMUNIZATION: ICD-10-CM

## 2021-10-06 PROCEDURE — 90686 IIV4 VACC NO PRSV 0.5 ML IM: CPT

## 2021-10-06 PROCEDURE — G0008: CPT

## 2021-10-06 PROCEDURE — 99214 OFFICE O/P EST MOD 30 MIN: CPT | Mod: 25

## 2021-10-06 RX ORDER — INSULIN LISPRO 100 [IU]/ML
100 INJECTION, SOLUTION INTRAVENOUS; SUBCUTANEOUS
Refills: 0 | Status: DISCONTINUED | COMMUNITY
End: 2021-10-06

## 2021-10-06 RX ORDER — FLUTICASONE PROPIONATE 50 UG/1
50 SPRAY, METERED NASAL DAILY
Qty: 1 | Refills: 0 | Status: DISCONTINUED | COMMUNITY
Start: 2020-01-13 | End: 2021-10-06

## 2021-10-07 VITALS
WEIGHT: 149 LBS | SYSTOLIC BLOOD PRESSURE: 120 MMHG | DIASTOLIC BLOOD PRESSURE: 70 MMHG | HEIGHT: 67 IN | RESPIRATION RATE: 14 BRPM | OXYGEN SATURATION: 98 % | BODY MASS INDEX: 23.39 KG/M2 | TEMPERATURE: 98 F | HEART RATE: 87 BPM

## 2021-10-07 PROBLEM — Z23 ENCOUNTER FOR IMMUNIZATION: Status: ACTIVE | Noted: 2020-11-16

## 2021-10-07 NOTE — HEALTH RISK ASSESSMENT
[No] : In the past 12 months have you used drugs other than those required for medical reasons? No [No falls in past year] : Patient reported no falls in the past year [0] : 2) Feeling down, depressed, or hopeless: Not at all (0) [PHQ-2 Negative - No further assessment needed] : PHQ-2 Negative - No further assessment needed [] : No [[id number]] : 0

## 2021-10-07 NOTE — HISTORY OF PRESENT ILLNESS
[FreeTextEntry1] : follow uo [de-identified] : SKYE VIK is a 25 year old M who presents today for diabetes glucoses better controlled labs discussed last A1c 6.7

## 2021-10-07 NOTE — END OF VISIT
[FreeTextEntry3] : "I, Gretel Estrada, personally scribed the services dictated to me by Dr. Horacio Malhotra MD in this documentation on 10/06/2021 "\par \par "I Dr. Horacio Malhotra MD, personally performed the services described in this documentation on 10/06/2021 for the patient as scribed by Gretel Estrada in my presence. I have reviewed and verified that all the information is accurate and true."\par \par

## 2021-10-07 NOTE — HEALTH RISK ASSESSMENT
[No] : In the past 12 months have you used drugs other than those required for medical reasons? No [No falls in past year] : Patient reported no falls in the past year [0] : 2) Feeling down, depressed, or hopeless: Not at all (0) [] : No [[id number]] : 0

## 2021-10-07 NOTE — HISTORY OF PRESENT ILLNESS
[FreeTextEntry1] : follow up [de-identified] : SKYE CHERY is a 25 year old M who presents today for follow up for DM. The DM has been better controlled. Labs discussed and the last A1c was 6.7.

## 2021-11-16 ENCOUNTER — APPOINTMENT (OUTPATIENT)
Dept: ENDOCRINOLOGY | Facility: CLINIC | Age: 26
End: 2021-11-16
Payer: SELF-PAY

## 2021-11-16 VITALS — WEIGHT: 144 LBS | BODY MASS INDEX: 22.6 KG/M2 | HEIGHT: 67 IN

## 2021-11-16 PROCEDURE — G0108 DIAB MANAGE TRN  PER INDIV: CPT

## 2021-12-22 ENCOUNTER — NON-APPOINTMENT (OUTPATIENT)
Age: 26
End: 2021-12-22

## 2022-01-03 ENCOUNTER — NON-APPOINTMENT (OUTPATIENT)
Age: 27
End: 2022-01-03

## 2022-01-04 ENCOUNTER — APPOINTMENT (OUTPATIENT)
Dept: ENDOCRINOLOGY | Facility: CLINIC | Age: 27
End: 2022-01-04

## 2022-02-09 ENCOUNTER — APPOINTMENT (OUTPATIENT)
Dept: ENDOCRINOLOGY | Facility: CLINIC | Age: 27
End: 2022-02-09

## 2022-03-16 ENCOUNTER — APPOINTMENT (OUTPATIENT)
Dept: ENDOCRINOLOGY | Facility: CLINIC | Age: 27
End: 2022-03-16

## 2022-03-16 ENCOUNTER — APPOINTMENT (OUTPATIENT)
Dept: ENDOCRINOLOGY | Facility: CLINIC | Age: 27
End: 2022-03-16
Payer: COMMERCIAL

## 2022-03-16 VITALS — HEIGHT: 67 IN | WEIGHT: 148 LBS | BODY MASS INDEX: 23.23 KG/M2

## 2022-03-16 PROCEDURE — G0108 DIAB MANAGE TRN  PER INDIV: CPT

## 2022-03-28 ENCOUNTER — APPOINTMENT (OUTPATIENT)
Dept: INTERNAL MEDICINE | Facility: CLINIC | Age: 27
End: 2022-03-28
Payer: COMMERCIAL

## 2022-03-28 VITALS
OXYGEN SATURATION: 98 % | HEIGHT: 67 IN | DIASTOLIC BLOOD PRESSURE: 70 MMHG | BODY MASS INDEX: 23.54 KG/M2 | SYSTOLIC BLOOD PRESSURE: 116 MMHG | RESPIRATION RATE: 14 BRPM | HEART RATE: 90 BPM | WEIGHT: 150 LBS

## 2022-03-28 DIAGNOSIS — J01.90 ACUTE SINUSITIS, UNSPECIFIED: ICD-10-CM

## 2022-03-28 DIAGNOSIS — R09.82 POSTNASAL DRIP: ICD-10-CM

## 2022-03-28 PROCEDURE — 99214 OFFICE O/P EST MOD 30 MIN: CPT

## 2022-03-28 RX ORDER — FLUTICASONE PROPIONATE 50 UG/1
50 SPRAY, METERED NASAL DAILY
Qty: 1 | Refills: 2 | Status: ACTIVE | COMMUNITY
Start: 2022-03-28 | End: 1900-01-01

## 2022-03-28 RX ORDER — DOXYCYCLINE 100 MG/1
100 TABLET, FILM COATED ORAL
Qty: 14 | Refills: 0 | Status: ACTIVE | COMMUNITY
Start: 2022-03-28 | End: 1900-01-01

## 2022-03-28 NOTE — HISTORY OF PRESENT ILLNESS
[___ Weeks ago] :  [unfilled] weeks ago [Congestion] : congestion [Cough] : cough [Sore Throat] : sore throat [Headache] : headache [Stable] : stable [Wheezing] : no wheezing [Chills] : no chills [Anorexia] : no anorexia [Shortness Of Breath] : no shortness of breath [Earache] : no earache [Fatigue] : not fatigue [Fever] : no fever [FreeTextEntry8] : SKYE CHERY is a 26 year old M who presents today for cough sinus congestion no fever or chills\par went to Urgent  care started on Prednisone\par glucoses higher \par post nasal drip

## 2022-03-30 ENCOUNTER — NON-APPOINTMENT (OUTPATIENT)
Age: 27
End: 2022-03-30

## 2022-04-08 ENCOUNTER — APPOINTMENT (OUTPATIENT)
Dept: ENDOCRINOLOGY | Facility: CLINIC | Age: 27
End: 2022-04-08
Payer: COMMERCIAL

## 2022-04-08 VITALS
DIASTOLIC BLOOD PRESSURE: 75 MMHG | OXYGEN SATURATION: 99 % | HEART RATE: 80 BPM | WEIGHT: 150 LBS | BODY MASS INDEX: 23.54 KG/M2 | HEIGHT: 67 IN | SYSTOLIC BLOOD PRESSURE: 119 MMHG

## 2022-04-08 PROCEDURE — 95251 CONT GLUC MNTR ANALYSIS I&R: CPT

## 2022-04-08 PROCEDURE — 36415 COLL VENOUS BLD VENIPUNCTURE: CPT

## 2022-04-08 PROCEDURE — 82962 GLUCOSE BLOOD TEST: CPT

## 2022-04-08 PROCEDURE — 99215 OFFICE O/P EST HI 40 MIN: CPT | Mod: 25

## 2022-04-10 NOTE — PHYSICAL EXAM
[Alert] : alert [Well Nourished] : well nourished [No Acute Distress] : no acute distress [Well Developed] : well developed [Normal Voice/Communication] : normal voice communication [No Rash] : no rash [Right foot was examined, including] : right foot ~C was examined, including visual inspection with sensory and pulse exams [Left foot was examined, including] : left foot ~C was examined, including visual inspection with sensory and pulse exams [Normal] : normal [Full ROM] : with full range of motion [Diminished Throughout Both Feet] : normal tactile sensation with monofilament testing throughout both feet [Oriented x3] : oriented to person, place, and time

## 2022-04-10 NOTE — ASSESSMENT
[FreeTextEntry1] : 26 year old male with past medical history of Diabetes Mellitus Type 1,  who presents for management of his diabetes\par \par 1. DM 1- controlled, uncomplicated\par Patient counseled on the importance of diabetic control and complications. Patient's diet and the necessary changes discussed. Reviewed over Dexcom. Overall much improved in glycemic control. Have to reduce the lows and highs further.\par \par New settings\par Basal:\par 12 AM: 1.15\par 4 AM: 0.9\par 11 AM: .5\par 5 PM: 0.8 \par 9 PM: 0.9\par \par ISF:\par 12 AM: 50\par \par I:C\par 12 AM: 14\par 4 AM: 5\par 11 AM: 6\par 5 PM: 6\par 9 PM: 8\par \par Target:\par 12 AM: 120\par \par Check sensor\par Cont diabetic diet\par Cont exercise\par labs today\par Blood drawn in the office\par Opthalmology Visit needs follow up\par Foot Exam up to date\par \par Follow up in 4 months\par \par

## 2022-04-10 NOTE — HISTORY OF PRESENT ILLNESS
[FreeTextEntry1] : Mr. SKYE CHERY  is a 25 year old male with past medical history of Diabetes Mellitus Type 1  who presents for management of his diabetes. Patient was on steroids for a few days but stopped.\par \par \par POCT Glucose: 116 mg/dL\par POCT Hga1c:  %\par \par Diabetes first diagnosed: 2004\par Type: 1\par \par Current diabetic regimen:\par Omnipod with Novolog \par Pump settings: \par Basal:\par 12 AM: 1.15\par 4 AM: 0.9\par 11 AM: .5\par 5 PM: 0.8 \par 9 PM: 0.9\par \par ISF:\par 12 AM: 50\par \par I:C\par 12 AM: 14\par 4 AM: 5\par 11 AM: 6\par 5 PM: 7\par 9 PM: 9\par \par Target:\par 12 AM: 110\par \par \par Self monitoring blood glucose : Patient uses Dexcom \par \par AGP Report: \par (3/9/22-4/7/22)\par High: 35%\par Target (): 64%\par Low: 1%\par \par \par Diet: Eats everything. Will eat simple carbs and extend bolus\par \par Exercise:\par \par Urine micro:wnl (8/2021), wnl (1/2021)\par lipid profile:LDL 96 (8/2021), LDL 96 (1/2021)\par last hba1c: 6.8% (6/2021) 8.5% (1/2021)\par eye exam: 12/2020\par diabetic foot exam/podiatry:in office (3/2021) \par \par \par \par \par \par

## 2022-04-18 ENCOUNTER — NON-APPOINTMENT (OUTPATIENT)
Age: 27
End: 2022-04-18

## 2022-04-18 LAB
ALBUMIN SERPL ELPH-MCNC: 4.9 G/DL
ALP BLD-CCNC: 78 U/L
ALT SERPL-CCNC: 27 U/L
ANION GAP SERPL CALC-SCNC: 13 MMOL/L
AST SERPL-CCNC: 20 U/L
BASOPHILS # BLD AUTO: 0.04 K/UL
BASOPHILS NFR BLD AUTO: 0.5 %
BILIRUB SERPL-MCNC: 0.4 MG/DL
BUN SERPL-MCNC: 11 MG/DL
CALCIUM SERPL-MCNC: 9.6 MG/DL
CHLORIDE SERPL-SCNC: 101 MMOL/L
CHOLEST SERPL-MCNC: 170 MG/DL
CO2 SERPL-SCNC: 25 MMOL/L
CREAT SERPL-MCNC: 0.61 MG/DL
CREAT SPEC-SCNC: 61 MG/DL
EGFR: 134 ML/MIN/1.73M2
EOSINOPHIL # BLD AUTO: 0.07 K/UL
EOSINOPHIL NFR BLD AUTO: 0.9 %
ESTIMATED AVERAGE GLUCOSE: 157 MG/DL
GLUCOSE SERPL-MCNC: 137 MG/DL
HBA1C MFR BLD HPLC: 7.1 %
HCT VFR BLD CALC: 42.8 %
HDLC SERPL-MCNC: 55 MG/DL
HGB BLD-MCNC: 14.2 G/DL
IMM GRANULOCYTES NFR BLD AUTO: 0.3 %
LDLC SERPL CALC-MCNC: 102 MG/DL
LYMPHOCYTES # BLD AUTO: 2.03 K/UL
LYMPHOCYTES NFR BLD AUTO: 25.5 %
MAN DIFF?: NORMAL
MCHC RBC-ENTMCNC: 28.5 PG
MCHC RBC-ENTMCNC: 33.2 GM/DL
MCV RBC AUTO: 85.9 FL
MICROALBUMIN 24H UR DL<=1MG/L-MCNC: <1.2 MG/DL
MICROALBUMIN/CREAT 24H UR-RTO: NORMAL MG/G
MONOCYTES # BLD AUTO: 0.56 K/UL
MONOCYTES NFR BLD AUTO: 7 %
NEUTROPHILS # BLD AUTO: 5.23 K/UL
NEUTROPHILS NFR BLD AUTO: 65.8 %
NONHDLC SERPL-MCNC: 115 MG/DL
PLATELET # BLD AUTO: 228 K/UL
POTASSIUM SERPL-SCNC: 4.3 MMOL/L
PROT SERPL-MCNC: 6.9 G/DL
RBC # BLD: 4.98 M/UL
RBC # FLD: 13 %
SODIUM SERPL-SCNC: 139 MMOL/L
TRIGL SERPL-MCNC: 66 MG/DL
TSH SERPL-ACNC: 1.07 UIU/ML
WBC # FLD AUTO: 7.95 K/UL

## 2022-05-11 ENCOUNTER — APPOINTMENT (OUTPATIENT)
Dept: ENDOCRINOLOGY | Facility: CLINIC | Age: 27
End: 2022-05-11
Payer: COMMERCIAL

## 2022-05-11 PROCEDURE — G0108 DIAB MANAGE TRN  PER INDIV: CPT

## 2022-06-13 ENCOUNTER — APPOINTMENT (OUTPATIENT)
Dept: ENDOCRINOLOGY | Facility: CLINIC | Age: 27
End: 2022-06-13
Payer: COMMERCIAL

## 2022-06-13 PROCEDURE — G0108 DIAB MANAGE TRN  PER INDIV: CPT

## 2022-08-10 ENCOUNTER — APPOINTMENT (OUTPATIENT)
Dept: ENDOCRINOLOGY | Facility: CLINIC | Age: 27
End: 2022-08-10

## 2022-08-10 VITALS
HEART RATE: 83 BPM | WEIGHT: 146 LBS | OXYGEN SATURATION: 97 % | BODY MASS INDEX: 22.91 KG/M2 | DIASTOLIC BLOOD PRESSURE: 86 MMHG | SYSTOLIC BLOOD PRESSURE: 127 MMHG | HEIGHT: 67 IN

## 2022-08-10 PROCEDURE — 82962 GLUCOSE BLOOD TEST: CPT

## 2022-08-10 PROCEDURE — 83036 HEMOGLOBIN GLYCOSYLATED A1C: CPT | Mod: QW

## 2022-08-10 PROCEDURE — 95251 CONT GLUC MNTR ANALYSIS I&R: CPT

## 2022-08-10 PROCEDURE — 99214 OFFICE O/P EST MOD 30 MIN: CPT | Mod: 25

## 2022-08-11 NOTE — PHYSICAL EXAM
[Alert] : alert [No Acute Distress] : no acute distress [Well Developed] : well developed [Normal Voice/Communication] : normal voice communication [Normal Sclera/Conjunctiva] : normal sclera/conjunctiva [No Respiratory Distress] : no respiratory distress [No Rash] : no rash [Oriented x3] : oriented to person, place, and time

## 2022-08-11 NOTE — ASSESSMENT
[FreeTextEntry1] : 26 year old male with past medical history of Diabetes Mellitus Type 1,  who presents for management of his diabetes\par \par 1. DM 1- controlled, uncomplicated\par Patient counseled on the importance of diabetic control and complications. Patient's diet and the necessary changes discussed. Reviewed over Dexcom. Overall much improved in glycemic control. Have to reduce the lows and highs further.\par \par New settings\par Basal:\par 12 AM: 1.15\par 4 AM: 0.9\par 11 AM: .5\par 5 PM: 0.7*\par 9 PM: 0.9*\par \par ISF:\par 12 AM: 50\par \par I:C\par 12 AM: 14\par 4 AM: 5\par 11 AM: 6\par 5 PM: 6\par 9 PM: 8\par \par Target:\par 12 AM: 120\par \par Check sensor\par Cont diabetic diet\par Cont exercise\par labs utd\par Blood drawn in the office\par Opthalmology Visit needs follow up\par Foot Exam up to date\par \par Follow up in 3 months\par \par

## 2022-08-11 NOTE — HISTORY OF PRESENT ILLNESS
[FreeTextEntry1] : Mr. SKYE CHERY  is a 26 year old male with past medical history of Diabetes Mellitus Type 1  who presents for management of his diabetes. Patient was on steroids for a few days but stopped.\par \par \par POCT Glucose: 222 mg/dL\par POCT Hga1c: 6.7 %\par \par Diabetes first diagnosed: 2004\par Type: 1\par \par Current diabetic regimen:\par Omnipod with Novolog \par Pump settings: \par Basal:\par 12 AM: 1.15\par 4 AM: 0.9\par 11 AM: .4\par 5 PM: 0.8 \par 9 PM: 1\par \par ISF:\par 12 AM: 50\par \par I:C\par 12 AM: 14\par 4 AM: 5\par 11 AM: 6\par 5 PM: 6\par 9 PM: 7\par \par Target:\par 12 AM: 110\par \par \par Self monitoring blood glucose : Patient uses Dexcom \par \par AGP Report: \par (6/11/22-8/9/22)\par High: 21%\par Target (): 77%\par Low: 1%\par \par \par Diet: Eats everything. Will eat simple carbs and extend bolus\par \par Exercise:\par \par Urine micro:wnl (4/2022), wnl (8/2021), wnl (1/2021)\par lipid profile: (4/2022), LDL 96 (8/2021), LDL 96 (1/2021)\par last hba1c:6.7% (8/2022), 7.1% (4/2022)  6.8% (6/2021) 8.5% (1/2021)\par eye exam: 12/2020 \par diabetic foot exam/podiatry:in office (4/2022) \par \par \par \par \par \par

## 2022-11-16 ENCOUNTER — APPOINTMENT (OUTPATIENT)
Dept: ENDOCRINOLOGY | Facility: CLINIC | Age: 27
End: 2022-11-16

## 2022-11-16 VITALS
WEIGHT: 148 LBS | BODY MASS INDEX: 23.23 KG/M2 | OXYGEN SATURATION: 99 % | DIASTOLIC BLOOD PRESSURE: 78 MMHG | HEIGHT: 67 IN | SYSTOLIC BLOOD PRESSURE: 131 MMHG | HEART RATE: 83 BPM

## 2022-11-16 PROCEDURE — 83036 HEMOGLOBIN GLYCOSYLATED A1C: CPT | Mod: QW

## 2022-11-16 PROCEDURE — 99215 OFFICE O/P EST HI 40 MIN: CPT | Mod: 25

## 2022-11-16 PROCEDURE — 82962 GLUCOSE BLOOD TEST: CPT

## 2022-11-17 NOTE — HISTORY OF PRESENT ILLNESS
[FreeTextEntry1] : Mr. SKYE CHERY  is a 26 year old male with past medical history of Diabetes Mellitus Type 1  who presents for management of his diabetes. \par \par \par POCT Glucose: 172 mg/dL\par POCT Hga1c: 6.3 %\par \par Diabetes first diagnosed: 2004\par Type: 1\par \par Current diabetic regimen:\par Omnipod with Novolog \par Pump settings: \par Basal:\par 12 AM: 1.15\par 4 AM: 0.9\par 10 AM: .4\par 5 PM: 0.7\par 9 PM: 0.9\par \par ISF:\par 12 AM: 50\par \par I:C\par 12 AM: 14\par 4 AM: 5\par 10 AM: 6\par 5 PM: 6\par 9 PM: 7\par \par Target:\par 12 AM: 110\par \par \par Self monitoring blood glucose : Patient uses Dexcom \par \par AGP Report: \par (10/17/222-11/15/22)\par High: 21%\par Target (): 77%\par Low: 2%\par \par \par Diet: Eats everything. Will eat simple carbs and extend bolus\par \par Exercise:\par \par Urine micro:wnl (4/2022), wnl (8/2021), wnl (1/2021)\par lipid profile: (4/2022), LDL 96 (8/2021), LDL 96 (1/2021)\par last hba1c:6.3% (11/2022), 6.7% (8/2022), 7.1% (4/2022)  6.8% (6/2021) 8.5% (1/2021)\par eye exam: 12/2020 \par diabetic foot exam/podiatry:in office (4/2022) \par \par \par \par \par \par

## 2022-11-17 NOTE — END OF VISIT
Addended by: Jonnathan Drake on: 2/1/2022 10:25 AM     Modules accepted: Orders [Time Spent: ___ minutes] : I have spent [unfilled] minutes of time on the encounter.

## 2022-11-17 NOTE — ASSESSMENT
[FreeTextEntry1] : 26 year old male with past medical history of Diabetes Mellitus Type 1,  who presents for management of his diabetes\par \par 1. DM 1- controlled, uncomplicated\par Patient counseled on the importance of diabetic control and complications. Patient's diet and the necessary changes discussed. Reviewed over Dexcom. Overall much improved in glycemic control. Have to reduce the lows and highs further.\par \par New settings\par Basal:\par 12 AM: 1.15\par 4 AM: 0.9\par 10 AM: .5\par 5 PM: 0.7\par 9 PM: 0.9\par \par ISF:\par 12 AM: 50\par \par I:C\par 12 AM: 14\par 4 AM: 5.5*\par 10 AM: 7*\par 5 PM: 6\par 9 PM: 8\par \par Target:\par 12 AM: 120\par \par Check sensor\par Cont diabetic diet\par Cont exercise\par labs utd\par Opthalmology Visit needs follow up\par Foot Exam up to date\par \par Follow up in 4 months\par \par

## 2023-03-14 ENCOUNTER — RX RENEWAL (OUTPATIENT)
Age: 28
End: 2023-03-14

## 2023-03-15 ENCOUNTER — APPOINTMENT (OUTPATIENT)
Dept: ENDOCRINOLOGY | Facility: CLINIC | Age: 28
End: 2023-03-15
Payer: COMMERCIAL

## 2023-03-15 VITALS
BODY MASS INDEX: 23.23 KG/M2 | SYSTOLIC BLOOD PRESSURE: 122 MMHG | HEART RATE: 68 BPM | RESPIRATION RATE: 14 BRPM | HEIGHT: 67 IN | WEIGHT: 148 LBS | OXYGEN SATURATION: 97 % | DIASTOLIC BLOOD PRESSURE: 74 MMHG

## 2023-03-15 LAB
ALBUMIN SERPL ELPH-MCNC: 4.9 G/DL
ALP BLD-CCNC: 63 U/L
ALT SERPL-CCNC: 22 U/L
ANION GAP SERPL CALC-SCNC: 13 MMOL/L
AST SERPL-CCNC: 18 U/L
BASOPHILS # BLD AUTO: 0.05 K/UL
BASOPHILS NFR BLD AUTO: 0.8 %
BILIRUB SERPL-MCNC: 0.6 MG/DL
BUN SERPL-MCNC: 15 MG/DL
CALCIUM SERPL-MCNC: 9.9 MG/DL
CHLORIDE SERPL-SCNC: 103 MMOL/L
CHOLEST SERPL-MCNC: 182 MG/DL
CO2 SERPL-SCNC: 25 MMOL/L
CREAT SERPL-MCNC: 0.73 MG/DL
CREAT SPEC-SCNC: 374 MG/DL
EGFR: 126 ML/MIN/1.73M2
EOSINOPHIL # BLD AUTO: 0.12 K/UL
EOSINOPHIL NFR BLD AUTO: 2 %
ESTIMATED AVERAGE GLUCOSE: 157 MG/DL
FOLATE SERPL-MCNC: 16.6 NG/ML
FRUCTOSAMINE SERPL-MCNC: 317 UMOL/L
GLUCOSE SERPL-MCNC: 166 MG/DL
HBA1C MFR BLD HPLC: 7.1 %
HCT VFR BLD CALC: 43.2 %
HDLC SERPL-MCNC: 60 MG/DL
HGB BLD-MCNC: 14.3 G/DL
IMM GRANULOCYTES NFR BLD AUTO: 0.2 %
LDLC SERPL CALC-MCNC: 109 MG/DL
LYMPHOCYTES # BLD AUTO: 1.65 K/UL
LYMPHOCYTES NFR BLD AUTO: 28 %
MAN DIFF?: NORMAL
MCHC RBC-ENTMCNC: 29.5 PG
MCHC RBC-ENTMCNC: 33.1 GM/DL
MCV RBC AUTO: 89.3 FL
MICROALBUMIN 24H UR DL<=1MG/L-MCNC: 1.9 MG/DL
MICROALBUMIN/CREAT 24H UR-RTO: 5 MG/G
MONOCYTES # BLD AUTO: 0.48 K/UL
MONOCYTES NFR BLD AUTO: 8.1 %
NEUTROPHILS # BLD AUTO: 3.58 K/UL
NEUTROPHILS NFR BLD AUTO: 60.9 %
NONHDLC SERPL-MCNC: 122 MG/DL
PLATELET # BLD AUTO: 212 K/UL
POTASSIUM SERPL-SCNC: 4.3 MMOL/L
PROT SERPL-MCNC: 7 G/DL
RBC # BLD: 4.84 M/UL
RBC # FLD: 13 %
SODIUM SERPL-SCNC: 141 MMOL/L
TRIGL SERPL-MCNC: 66 MG/DL
TSH SERPL-ACNC: 1.66 UIU/ML
VIT B12 SERPL-MCNC: 769 PG/ML
WBC # FLD AUTO: 5.89 K/UL

## 2023-03-15 PROCEDURE — 95251 CONT GLUC MNTR ANALYSIS I&R: CPT

## 2023-03-15 PROCEDURE — 99215 OFFICE O/P EST HI 40 MIN: CPT | Mod: 25

## 2023-03-15 NOTE — ASSESSMENT
[FreeTextEntry1] : 27 year old male with past medical history of Diabetes Mellitus Type 1,  who presents for management of his diabetes\par \par 1. DM 1- controlled, uncomplicated\par Patient counseled on the importance of diabetic control and complications. Patient's diet and the necessary changes discussed. Reviewed over Dexcom. Overall much improved in glycemic control. Have to reduce the lows and highs further.\par \par Cont current settings\par Check sensor\par Cont diabetic diet\par Cont exercise\par labs utd\par Opthalmology Visit needs follow up\par Foot Exam up to date\par \par Follow up in 6 months\par \par

## 2023-03-15 NOTE — HISTORY OF PRESENT ILLNESS
[FreeTextEntry1] : Mr. SKYE CHERY  is a 27 year old male with past medical history of Diabetes Mellitus Type 1  who presents for management of his diabetes. \par \par \par POCT Glucose: 172 mg/dL\par POCT Hga1c: 6.3 %\par \par Diabetes first diagnosed: 2004\par Type: 1\par \par Current diabetic regimen:\par Omnipod with Novolog \par Pump settings: \par Basal:\par 12 AM: 1.15\par 4 AM: 0.9\par 10 AM: .4\par 5 PM: 0.7\par 9 PM: 0.9\par \par ISF:\par 12 AM: 50\par \par I:C\par 12 AM: 14\par 4 AM: 5.5\par 10 AM: 7\par 5 PM: 6\par 9 PM: 7\par \par Target:\par 12 AM: 110\par \par \par Self monitoring blood glucose : Patient uses Dexcom \par \par AGP Report: \par (3/8/23-3/14/23)\par High: 19%\par Target (): 81%\par Low: 1%\par \par \par Diet: Eats everything. Will eat simple carbs and extend bolus\par \par Exercise:\par \par Urine micro:wnl (4/2022), wnl (8/2021), wnl (1/2021)\par lipid profile: (4/2022), LDL 96 (8/2021), LDL 96 (1/2021)\par last hba1c:6.3% (11/2022), 6.7% (8/2022), 7.1% (4/2022)  6.8% (6/2021) 8.5% (1/2021)\par eye exam: 12/2020 \par diabetic foot exam/podiatry:in office (4/2022) \par \par \par \par \par \par

## 2023-07-19 RX ORDER — BLOOD SUGAR DIAGNOSTIC
STRIP MISCELLANEOUS 3 TIMES DAILY
Qty: 4 | Refills: 1 | Status: ACTIVE | COMMUNITY
Start: 2019-06-18 | End: 1900-01-01

## 2023-09-12 NOTE — HEALTH RISK ASSESSMENT
New order placed   [Never] : Never [Yes] : Yes [2 - 3 times a week (3 pts)] : 2 - 3  times a week (3 points) [1 or 2 (0 pts)] : 1 or 2 (0 points) [Never (0 pts)] : Never (0 points) [No falls in past year] : Patient reported no falls in the past year [0] : 2) Feeling down, depressed, or hopeless: Not at all (0) [PHQ-2 Negative - No further assessment needed] : PHQ-2 Negative - No further assessment needed [[id number]] : 0

## 2023-10-25 ENCOUNTER — APPOINTMENT (OUTPATIENT)
Dept: ENDOCRINOLOGY | Facility: CLINIC | Age: 28
End: 2023-10-25
Payer: COMMERCIAL

## 2023-10-25 VITALS
HEART RATE: 90 BPM | SYSTOLIC BLOOD PRESSURE: 111 MMHG | HEIGHT: 67 IN | DIASTOLIC BLOOD PRESSURE: 60 MMHG | OXYGEN SATURATION: 97 % | WEIGHT: 154 LBS | BODY MASS INDEX: 24.17 KG/M2

## 2023-10-25 PROCEDURE — 95251 CONT GLUC MNTR ANALYSIS I&R: CPT

## 2023-10-25 PROCEDURE — 99215 OFFICE O/P EST HI 40 MIN: CPT | Mod: 25

## 2023-10-25 PROCEDURE — 83036 HEMOGLOBIN GLYCOSYLATED A1C: CPT | Mod: QW

## 2023-10-25 RX ORDER — FLASH GLUCOSE SENSOR
KIT MISCELLANEOUS
Qty: 6 | Refills: 2 | Status: DISCONTINUED | COMMUNITY
Start: 2021-03-03 | End: 2023-10-25

## 2023-10-25 RX ORDER — INSULIN LISPRO 100 [IU]/ML
100 INJECTION, SOLUTION INTRAVENOUS; SUBCUTANEOUS
Qty: 60 | Refills: 0 | Status: DISCONTINUED | COMMUNITY
Start: 2023-07-19 | End: 2023-10-25

## 2023-11-25 ENCOUNTER — TRANSCRIPTION ENCOUNTER (OUTPATIENT)
Age: 28
End: 2023-11-25

## 2024-02-28 ENCOUNTER — APPOINTMENT (OUTPATIENT)
Dept: ENDOCRINOLOGY | Facility: CLINIC | Age: 29
End: 2024-02-28
Payer: COMMERCIAL

## 2024-02-28 VITALS
HEIGHT: 67 IN | OXYGEN SATURATION: 92 % | WEIGHT: 154 LBS | DIASTOLIC BLOOD PRESSURE: 80 MMHG | SYSTOLIC BLOOD PRESSURE: 128 MMHG | BODY MASS INDEX: 24.17 KG/M2 | HEART RATE: 91 BPM

## 2024-02-28 PROCEDURE — 99215 OFFICE O/P EST HI 40 MIN: CPT

## 2024-02-28 PROCEDURE — 95251 CONT GLUC MNTR ANALYSIS I&R: CPT

## 2024-02-28 RX ORDER — BLOOD-GLUCOSE TRANSMITTER
EACH MISCELLANEOUS
Qty: 1 | Refills: 2 | Status: DISCONTINUED | COMMUNITY
Start: 2021-07-02 | End: 2024-02-28

## 2024-02-28 RX ORDER — BLOOD-GLUCOSE SENSOR
EACH MISCELLANEOUS
Qty: 3 | Refills: 2 | Status: DISCONTINUED | COMMUNITY
Start: 2019-10-08 | End: 2024-02-28

## 2024-02-28 RX ORDER — BLOOD-GLUCOSE SENSOR
EACH MISCELLANEOUS
Qty: 9 | Refills: 2 | Status: ACTIVE | COMMUNITY
Start: 2024-02-28 | End: 1900-01-01

## 2024-02-28 RX ORDER — BLOOD-GLUCOSE,RECEIVER,CONT
EACH MISCELLANEOUS
Qty: 1 | Refills: 0 | Status: DISCONTINUED | COMMUNITY
Start: 2019-10-08 | End: 2024-02-28

## 2024-02-28 NOTE — HISTORY OF PRESENT ILLNESS
[FreeTextEntry1] : Mr. SKYE CHERY  is a 28 year old male with past medical history of Diabetes Mellitus Type 1  who presents for management of his diabetes.   POCT Glucose:  mg/dL POCT Hga1c:  %  Diabetes first diagnosed: 2004 Type: 1  Current diabetic regimen: Omnipod with Novolog  Pump settings:  Basal: 12 AM: 1.15 4 AM: 0.9 10 AM: .4 5 PM: 0.7 9 PM: 0.9  ISF: 12 AM: 50  I:C 12 AM: 14 4 AM: 5.5 10 AM: 7 5 PM: 5.5 9 PM: 7  Target: 12 AM: 110   Self monitoring blood glucose : Patient uses Dex[employer]   AGP Report:  (1/29/2024-2/27/2024) High: 34% Target (): 65% Low: 1%   Diet: Eats everything. Will eat simple carbs and extend bolus  Exercise:  Urine micro: WNL (3/2023), wnl (4/2022), wnl (8/2021), wnl (1/2021) lipid profile:  (3/2023),  (4/2022), LDL 96 (8/2021), LDL 96 (1/2021) last hba1c: 7.1% (3/2023), 6.3% (11/2022), 6.7% (8/2022), 7.1% (4/2022)  6.8% (6/2021) 8.5% (1/2021) eye exam: 2023 diabetic foot exam/podiatry:in office (10/2023)

## 2024-02-28 NOTE — ASSESSMENT
[FreeTextEntry1] : 28 year old male with past medical history of Diabetes Mellitus Type 1, who presents for management of his diabetes  1. DM 1- controlled, uncomplicated Patient counseled on the importance of diabetic control and complications. Patient's diet and the necessary changes discussed. Reviewed over Dexcom. Reviewed over diet and appropriate bolus.   Dec carb ratio at 10 AM to 6  Check sensor Cont diabetic diet Cont exercise labs due Opthalmology Visit up-to-date Foot Exam up to date RTC in 4 months

## 2024-06-25 LAB
ALBUMIN SERPL ELPH-MCNC: 4.7 G/DL
ALP BLD-CCNC: 84 U/L
ALT SERPL-CCNC: 17 U/L
ANION GAP SERPL CALC-SCNC: 12 MMOL/L
AST SERPL-CCNC: 17 U/L
BILIRUB SERPL-MCNC: 0.3 MG/DL
BUN SERPL-MCNC: 11 MG/DL
CALCIUM SERPL-MCNC: 9.4 MG/DL
CHLORIDE SERPL-SCNC: 102 MMOL/L
CHOLEST SERPL-MCNC: 184 MG/DL
CO2 SERPL-SCNC: 26 MMOL/L
CREAT SERPL-MCNC: 0.7 MG/DL
EGFR: 127 ML/MIN/1.73M2
ESTIMATED AVERAGE GLUCOSE: 151 MG/DL
GLUCOSE SERPL-MCNC: 152 MG/DL
HBA1C MFR BLD HPLC: 6.9 %
HCT VFR BLD CALC: 45.1 %
HDLC SERPL-MCNC: 59 MG/DL
HGB BLD-MCNC: 14.4 G/DL
LDLC SERPL CALC-MCNC: 106 MG/DL
MCHC RBC-ENTMCNC: 28.8 PG
MCHC RBC-ENTMCNC: 31.9 GM/DL
MCV RBC AUTO: 90.2 FL
NONHDLC SERPL-MCNC: 126 MG/DL
PLATELET # BLD AUTO: 239 K/UL
POTASSIUM SERPL-SCNC: 4.2 MMOL/L
PROT SERPL-MCNC: 6.7 G/DL
RBC # BLD: 5 M/UL
RBC # FLD: 13.4 %
SODIUM SERPL-SCNC: 140 MMOL/L
TRIGL SERPL-MCNC: 110 MG/DL
TSH SERPL-ACNC: 1.52 UIU/ML
WBC # FLD AUTO: 6 K/UL

## 2024-06-26 ENCOUNTER — APPOINTMENT (OUTPATIENT)
Dept: ENDOCRINOLOGY | Facility: CLINIC | Age: 29
End: 2024-06-26
Payer: COMMERCIAL

## 2024-06-26 VITALS
OXYGEN SATURATION: 96 % | HEIGHT: 67 IN | DIASTOLIC BLOOD PRESSURE: 73 MMHG | SYSTOLIC BLOOD PRESSURE: 131 MMHG | WEIGHT: 152 LBS | BODY MASS INDEX: 23.86 KG/M2 | HEART RATE: 87 BPM

## 2024-06-26 DIAGNOSIS — E10.9 TYPE 1 DIABETES MELLITUS W/OUT COMPLICATIONS: ICD-10-CM

## 2024-06-26 DIAGNOSIS — Z96.41 PRESENCE OF INSULIN PUMP (EXTERNAL) (INTERNAL): ICD-10-CM

## 2024-06-26 PROCEDURE — G2211 COMPLEX E/M VISIT ADD ON: CPT | Mod: NC

## 2024-06-26 PROCEDURE — 95251 CONT GLUC MNTR ANALYSIS I&R: CPT

## 2024-06-26 PROCEDURE — 99215 OFFICE O/P EST HI 40 MIN: CPT | Mod: 25

## 2024-06-26 RX ORDER — GLUCAGON 3 MG/1
3 POWDER NASAL
Qty: 1 | Refills: 0 | Status: ACTIVE | COMMUNITY
Start: 2021-03-03 | End: 1900-01-01

## 2024-06-26 RX ORDER — INFUSION SET FOR INSULIN PUMP
INFUSION SETS-PARAPHERNALIA MISCELLANEOUS
Qty: 2 | Refills: 2 | Status: DISCONTINUED | COMMUNITY
Start: 2021-06-22 | End: 2024-06-26

## 2024-06-26 RX ORDER — INSULIN PUMP CARTRIDGE
CARTRIDGE (EA) SUBCUTANEOUS
Qty: 9 | Refills: 2 | Status: ACTIVE | COMMUNITY
Start: 2023-03-14 | End: 1900-01-01

## 2024-06-26 RX ORDER — GLUCAGON INJECTION, SOLUTION 1 MG/.2ML
1 INJECTION, SOLUTION SUBCUTANEOUS
Qty: 1 | Refills: 2 | Status: DISCONTINUED | COMMUNITY
Start: 2021-03-09 | End: 2024-06-26

## 2024-06-26 RX ORDER — INSULIN ASPART 100 [IU]/ML
100 INJECTION, SOLUTION INTRAVENOUS; SUBCUTANEOUS
Qty: 7 | Refills: 3 | Status: DISCONTINUED | COMMUNITY
Start: 2019-06-18 | End: 2024-06-26

## 2024-06-26 RX ORDER — INSULIN LISPRO 100 [IU]/ML
100 INJECTION, SOLUTION INTRAVENOUS; SUBCUTANEOUS
Qty: 10 | Refills: 2 | Status: DISCONTINUED | COMMUNITY
Start: 2021-12-20 | End: 2024-06-26

## 2024-06-26 RX ORDER — INSULIN LISPRO 100 [IU]/ML
100 INJECTION, SOLUTION INTRAVENOUS; SUBCUTANEOUS
Qty: 3 | Refills: 2 | Status: ACTIVE | COMMUNITY
Start: 2023-02-21 | End: 1900-01-01

## 2024-06-26 RX ORDER — INFUSION SET FOR INSULIN PUMP
INFUSION SETS-PARAPHERNALIA MISCELLANEOUS
Qty: 1 | Refills: 2 | Status: ACTIVE | COMMUNITY
Start: 2023-03-14 | End: 1900-01-01

## 2024-07-08 ENCOUNTER — APPOINTMENT (OUTPATIENT)
Dept: ENDOCRINOLOGY | Facility: CLINIC | Age: 29
End: 2024-07-08

## 2024-09-24 NOTE — REVIEW OF SYSTEMS
[Negative] : Heme/Lymph Incidental Actinic Keratosis Histology Text: There is atypia of the keratinocytes in the basal layer of the epidermis, consistent with an actinic keratosis.

## 2024-09-25 ENCOUNTER — APPOINTMENT (OUTPATIENT)
Dept: ENDOCRINOLOGY | Facility: CLINIC | Age: 29
End: 2024-09-25
Payer: COMMERCIAL

## 2024-09-25 VITALS
HEART RATE: 73 BPM | HEIGHT: 67 IN | DIASTOLIC BLOOD PRESSURE: 73 MMHG | BODY MASS INDEX: 23.7 KG/M2 | SYSTOLIC BLOOD PRESSURE: 114 MMHG | WEIGHT: 151 LBS | OXYGEN SATURATION: 96 %

## 2024-09-25 DIAGNOSIS — E10.9 TYPE 1 DIABETES MELLITUS W/OUT COMPLICATIONS: ICD-10-CM

## 2024-09-25 DIAGNOSIS — Z96.41 PRESENCE OF INSULIN PUMP (EXTERNAL) (INTERNAL): ICD-10-CM

## 2024-09-25 PROCEDURE — 83036 HEMOGLOBIN GLYCOSYLATED A1C: CPT | Mod: QW

## 2024-09-25 PROCEDURE — 99215 OFFICE O/P EST HI 40 MIN: CPT

## 2024-09-25 PROCEDURE — 95251 CONT GLUC MNTR ANALYSIS I&R: CPT

## 2024-09-25 NOTE — REASON FOR VISIT
No treatment ordered at this time. Will continue to monitor. 8U humalog to be given at this time. ADS made aware Ads made aware Ads made aware Ads made aware, 4OZ given will recheck in 15min Simethicone 80mg CLOSELY MONITORED REPEAT AGAIN PER PA  FS AT [id number] [Follow - Up] : a follow-up visit [DM Type 2] : DM Type 2 [DM Type 1] : DM Type 1

## 2024-09-25 NOTE — HISTORY OF PRESENT ILLNESS
[FreeTextEntry1] : Mr. SKYE CHERY  is a 28 year old male with past medical history of Diabetes Mellitus Type 1  who presents for management of his diabetes.   POCT Glucose:  mg/dL POCT Hga1c:  %  Diabetes first diagnosed: 2004 Type: 1  Current diabetic regimen: Tandem T-slim with Humalog  Pump settings:  Basal: 12 AM: 1.15 4 AM: 0.9 10 AM: .4 5 PM: 0.7 9 PM: 0.9  ISF: 12 AM: 50  I:C 12 AM: 14 4 AM: 5.5 10 AM: 6 5 PM: 5.5 9 PM: 7  Target: 12 AM: 110   Self monitoring blood glucose : Patient uses Dexcom G7  AGP Report:  (9/11/24-9/24/24) High: 25% Target (): 69% Low: 1%   Diet: Eats everything. Will eat simple carbs and extend bolus  Exercise:softball  Urine micro: WNL (3/2023), wnl (4/2022), wnl (8/2021), wnl (1/2021) lipid profile:  (3/2023),  (4/2022), LDL 96 (8/2021), LDL 96 (1/2021) last hba1c: 7.1% (3/2023), 6.3% (11/2022), 6.7% (8/2022), 7.1% (4/2022)  6.8% (6/2021) 8.5% (1/2021) eye exam: 2023 diabetic foot exam/podiatry:in office (10/2023)

## 2024-09-25 NOTE — PHYSICAL EXAM
[Alert] : alert [No Acute Distress] : no acute distress [Well Developed] : well developed [Normal Voice/Communication] : normal voice communication [No Rash] : no rash [Oriented x3] : oriented to person, place, and time [No Respiratory Distress] : no respiratory distress [No Accessory Muscle Use] : no accessory muscle use [Clear to Auscultation] : lungs were clear to auscultation bilaterally [Normal S1, S2] : normal S1 and S2 [Normal Rate] : heart rate was normal [Regular Rhythm] : with a regular rhythm

## 2024-09-25 NOTE — ASSESSMENT
[FreeTextEntry1] : 28 year old male with past medical history of Diabetes Mellitus Type 1, who presents for management of his diabetes  1. DM 1- controlled, uncomplicated Patient counseled on the importance of diabetic control and complications. Patient's diet and the necessary changes discussed. Reviewed over Dexcom. Reviewed over diet and appropriate bolus.   Cont current settings Check sensor Cont diabetic diet Cont exercise labs up to date Opthalmology Visit due Foot Exam up to date  RTC in 4 months

## 2024-09-25 NOTE — PHYSICAL EXAM
[Alert] : alert [No Acute Distress] : no acute distress [Well Developed] : well developed [Normal Voice/Communication] : normal voice communication [No Rash] : no rash [Oriented x3] : oriented to person, place, and time [No Respiratory Distress] : no respiratory distress [No Accessory Muscle Use] : no accessory muscle use [Clear to Auscultation] : lungs were clear to auscultation bilaterally [Normal Rate] : heart rate was normal [Normal S1, S2] : normal S1 and S2 [Regular Rhythm] : with a regular rhythm

## 2024-09-27 LAB — HBA1C MFR BLD HPLC: 7

## 2024-12-04 RX ORDER — LANCETS 33 GAUGE
EACH MISCELLANEOUS
Qty: 1 | Refills: 2 | Status: ACTIVE | COMMUNITY
Start: 2024-12-04 | End: 1900-01-01

## 2024-12-04 RX ORDER — BLOOD SUGAR DIAGNOSTIC
STRIP MISCELLANEOUS 3 TIMES DAILY
Qty: 1 | Refills: 2 | Status: ACTIVE | COMMUNITY
Start: 2024-12-04 | End: 1900-01-01

## 2024-12-04 RX ORDER — BLOOD-GLUCOSE METER
W/DEVICE EACH MISCELLANEOUS
Qty: 1 | Refills: 0 | Status: ACTIVE | COMMUNITY
Start: 2024-12-04 | End: 1900-01-01

## 2024-12-16 ENCOUNTER — APPOINTMENT (OUTPATIENT)
Dept: ENDOCRINOLOGY | Facility: CLINIC | Age: 29
End: 2024-12-16

## 2025-01-27 ENCOUNTER — APPOINTMENT (OUTPATIENT)
Dept: ENDOCRINOLOGY | Facility: CLINIC | Age: 30
End: 2025-01-27
Payer: COMMERCIAL

## 2025-01-27 ENCOUNTER — RESULT CHARGE (OUTPATIENT)
Age: 30
End: 2025-01-27

## 2025-01-27 VITALS
HEIGHT: 67 IN | OXYGEN SATURATION: 98 % | WEIGHT: 154 LBS | BODY MASS INDEX: 24.17 KG/M2 | HEART RATE: 58 BPM | DIASTOLIC BLOOD PRESSURE: 70 MMHG | SYSTOLIC BLOOD PRESSURE: 118 MMHG

## 2025-01-27 DIAGNOSIS — Z96.41 PRESENCE OF INSULIN PUMP (EXTERNAL) (INTERNAL): ICD-10-CM

## 2025-01-27 DIAGNOSIS — E10.9 TYPE 1 DIABETES MELLITUS W/OUT COMPLICATIONS: ICD-10-CM

## 2025-01-27 PROCEDURE — 95251 CONT GLUC MNTR ANALYSIS I&R: CPT

## 2025-01-27 PROCEDURE — 99215 OFFICE O/P EST HI 40 MIN: CPT

## 2025-05-19 DIAGNOSIS — E10.9 TYPE 1 DIABETES MELLITUS W/OUT COMPLICATIONS: ICD-10-CM

## 2025-05-19 RX ORDER — INSULIN DEGLUDEC INJECTION 100 U/ML
100 INJECTION, SOLUTION SUBCUTANEOUS
Qty: 1 | Refills: 0 | Status: ACTIVE | COMMUNITY
Start: 2025-05-19 | End: 1900-01-01

## 2025-05-19 RX ORDER — PEN NEEDLE, DIABETIC 32GX 5/32"
32G X 4 MM NEEDLE, DISPOSABLE MISCELLANEOUS
Qty: 4 | Refills: 0 | Status: ACTIVE | COMMUNITY
Start: 2025-05-19 | End: 1900-01-01

## 2025-05-19 RX ORDER — INSULIN LISPRO-AABC 100 [IU]/ML
100 INJECTION, SOLUTION SUBCUTANEOUS
Qty: 1 | Refills: 0 | Status: ACTIVE | COMMUNITY
Start: 2025-05-19 | End: 1900-01-01

## 2025-07-29 ENCOUNTER — APPOINTMENT (OUTPATIENT)
Dept: ENDOCRINOLOGY | Facility: CLINIC | Age: 30
End: 2025-07-29
Payer: COMMERCIAL

## 2025-07-29 VITALS
SYSTOLIC BLOOD PRESSURE: 110 MMHG | BODY MASS INDEX: 24.17 KG/M2 | DIASTOLIC BLOOD PRESSURE: 64 MMHG | HEIGHT: 67 IN | WEIGHT: 154 LBS | HEART RATE: 79 BPM | OXYGEN SATURATION: 97 %

## 2025-07-29 DIAGNOSIS — E10.9 TYPE 1 DIABETES MELLITUS W/OUT COMPLICATIONS: ICD-10-CM

## 2025-07-29 DIAGNOSIS — Z96.41 PRESENCE OF INSULIN PUMP (EXTERNAL) (INTERNAL): ICD-10-CM

## 2025-07-29 PROCEDURE — 99214 OFFICE O/P EST MOD 30 MIN: CPT

## 2025-07-29 PROCEDURE — 83036 HEMOGLOBIN GLYCOSYLATED A1C: CPT | Mod: QW

## 2025-07-29 PROCEDURE — G2211 COMPLEX E/M VISIT ADD ON: CPT | Mod: NC

## 2025-07-29 PROCEDURE — 95251 CONT GLUC MNTR ANALYSIS I&R: CPT

## 2025-08-08 LAB
ALBUMIN SERPL ELPH-MCNC: 4.7 G/DL
ALBUMIN, RANDOM URINE: <1.2 MG/DL
ALP BLD-CCNC: 87 U/L
ALT SERPL-CCNC: 33 U/L
ANION GAP SERPL CALC-SCNC: 16 MMOL/L
AST SERPL-CCNC: 36 U/L
BILIRUB SERPL-MCNC: 0.2 MG/DL
BUN SERPL-MCNC: 18 MG/DL
CALCIUM SERPL-MCNC: 10.2 MG/DL
CHLORIDE SERPL-SCNC: 101 MMOL/L
CHOLEST SERPL-MCNC: 206 MG/DL
CO2 SERPL-SCNC: 21 MMOL/L
CREAT SERPL-MCNC: 0.61 MG/DL
CREAT SPEC-SCNC: 84 MG/DL
EGFRCR SERPLBLD CKD-EPI 2021: 132 ML/MIN/1.73M2
ESTIMATED AVERAGE GLUCOSE: 154 MG/DL
FOLATE SERPL-MCNC: 11.2 NG/ML
GLUCOSE SERPL-MCNC: 172 MG/DL
HBA1C MFR BLD HPLC: 7 %
HCT VFR BLD CALC: 42 %
HDLC SERPL-MCNC: 43 MG/DL
HGB BLD-MCNC: 13.6 G/DL
LDLC SERPL-MCNC: 101 MG/DL
MCHC RBC-ENTMCNC: 28.8 PG
MCHC RBC-ENTMCNC: 32.4 G/DL
MCV RBC AUTO: 89 FL
MICROALBUMIN/CREAT 24H UR-RTO: NORMAL MG/G
NONHDLC SERPL-MCNC: 163 MG/DL
PLATELET # BLD AUTO: 250 K/UL
POTASSIUM SERPL-SCNC: 4.9 MMOL/L
PROT SERPL-MCNC: 6.8 G/DL
RBC # BLD: 4.72 M/UL
RBC # FLD: 13 %
SODIUM SERPL-SCNC: 138 MMOL/L
TRIGL SERPL-MCNC: 363 MG/DL
TSH SERPL-ACNC: 2.03 UIU/ML
VIT B12 SERPL-MCNC: 797 PG/ML
WBC # FLD AUTO: 5.93 K/UL